# Patient Record
(demographics unavailable — no encounter records)

---

## 2024-12-12 NOTE — ASSESSMENT
[FreeTextEntry1] : NSVT several episodes of NSVT on monitor  will order coronary CT  Dyspnea in setting lung disease, recently worse ischemic evaluation planned as above   pAfib  NSR today  cont eliquis, toprol 50mg, diltiazem  discussed simplifying regimen, will consider next visit   HTN BP controlled on toprol, HCTZ, losartan, dilt    will f/u 3 months

## 2024-12-12 NOTE — HISTORY OF PRESENT ILLNESS
[FreeTextEntry1] : Ms. Mcmahan is a 74 yo female with a hx nephrolithiasis s/p nephrostomy tube, COPD on 2-3L baseline O2, Afib, PE, anxiety, hypertension, colon ca, RADHA, preDM presenting for a routine follow up.   Pt was initially admitted to  due to obstructive left ureteral stone requiring nephrostomy tube placement with course complicated by new Afib in setting PE 12/2023.     Cardiac monitor showed several episodes of NSVT  Reports recent worsening exertional dyspnea.  Denies chest pain, palpitations, dizziness, syncope    Surgical hx- total hysterectomy, colon ca resection fistula lung ca wedge resection on left, robe lower lobe, hip replacements, carpal tunnel, lithotripsy/nephrostomy tube   tobacco - prior, quit 10 years ago  alcohol- none  drug use- none  caffeine- none  family hx none  Ambulates minimally without assistance at home. Uses wheelchair when out for longer distances.

## 2024-12-12 NOTE — HISTORY OF PRESENT ILLNESS
[FreeTextEntry1] : Ms. Mcmahan is a 76 yo female with a hx nephrolithiasis s/p nephrostomy tube, COPD on 2-3L baseline O2, Afib, PE, anxiety, hypertension, colon ca, RADHA, preDM presenting for a routine follow up.   Pt was initially admitted to  due to obstructive left ureteral stone requiring nephrostomy tube placement with course complicated by new Afib in setting PE 12/2023.     Cardiac monitor showed several episodes of NSVT  Reports recent worsening exertional dyspnea.  Denies chest pain, palpitations, dizziness, syncope    Surgical hx- total hysterectomy, colon ca resection fistula lung ca wedge resection on left, robe lower lobe, hip replacements, carpal tunnel, lithotripsy/nephrostomy tube   tobacco - prior, quit 10 years ago  alcohol- none  drug use- none  caffeine- none  family hx none  Ambulates minimally without assistance at home. Uses wheelchair when out for longer distances.

## 2025-01-02 NOTE — PHYSICAL EXAM
[Normal Oropharynx] : normal oropharynx [Normal Appearance] : normal appearance [No Neck Mass] : no neck mass [Normal Rate/Rhythm] : normal rate/rhythm [Normal S1, S2] : normal s1, s2 [No Murmurs] : no murmurs [No Abnormalities] : no abnormalities [Benign] : benign [No Edema] : no edema [FROM] : FROM [No Focal Deficits] : no focal deficits [Oriented x3] : oriented x3 [Normal Affect] : normal affect [TextBox_2] : Breathless during exam [TextBox_68] : Bilateral wheezing throughout

## 2025-01-02 NOTE — HISTORY OF PRESENT ILLNESS
[TextBox_4] :  RIYA WHALEY 76-year female seen in clinic for FOLLOW UP VISIT for COPD with EMPHYSEMA.  Patient's breathing is chronically impaired with wheezing, cough and congestion with sputum.  Patient has had chronic, productive, and continued cough with large amounts of sputum production for the past 10 years and has tried and failed multiple including manual CPT therapy which was attempted but was found ineffective and failed to mobilize secretions because patient was intolerant and in pain during therapy.    Patient states that she is at baseline for all symptoms which is poor and there has been no change in the abundance of mucus production. She has tried many different therapies for the secretions with respiratory therapy, and manual CPT therapy, and some strange device that she is not even sure what that just did not help.  Patient is very frustrated with chronic breathing difficulty.  In the past 12 months the patient has been taking her inhalers Breztri 160, 2 puffs twice daily and albuterol as rescue as well as Roflumilast daily.  She is currently trying to be approved for Ohtuvayre inhalation solution for COPD.  In the past 12 months she has been treated with prednisone treatments (3 in the past 3 months). as well as Kenalog shots, doxycycline multiple times, azithromycin, cephalexin, Bactrim and tried and failed Advair Breva and Symbicort inhalers.

## 2025-01-02 NOTE — DISCUSSION/SUMMARY
[FreeTextEntry1] : 1. Acute Bronchitis, COPD w/ Emphysema, H/o lung cancer w/ lobectomy--> Kenalog injection given to patient in left deltoid with no complications.  Start doxycycline 100 mg twice daily for 10 days and prednisone 20 mg daily for 10 days.  Refills sent for Breztri, Roflumilast, albuterol, and ipratropium albuterol inhalation solution.  Prior Auth for Ohtuvayre inhalation solution is pending.  Will attempt to order compression vest for patient as well. 2. Bronchiectasis on Chest CT noted 8/11/2015 --> Will order Chest CT PRN.  3.  Patient has appointment in 6 weeks for follow-up and will keep appointment if still symptomatic otherwise we will see in 6 months for repeat PFT and symptom reevaluation.

## 2025-01-02 NOTE — REVIEW OF SYSTEMS
[Cough] : cough [Chest Tightness] : chest tightness [Sputum] : sputum [Dyspnea] : dyspnea [Wheezing] : wheezing [SOB on Exertion] : sob on exertion [Negative] : Neurologic [Fever] : no fever [Chills] : no chills [TextBox_30] : Chronic respiratory symptoms are at baseline

## 2025-02-03 NOTE — PLAN
[FreeTextEntry1] : Wash daily with hibiclens. Silvadene to wound. Finish po abc. F/U 1 week. Keep legs elevated. total time > 22 minutes

## 2025-02-03 NOTE — PHYSICAL EXAM
[JVD] : no jugular venous distention  [Normal Breath Sounds] : Normal breath sounds [Normal Heart Sounds] : normal heart sounds [Calm] : calm [de-identified] : soft [de-identified] : R post calf wound - 1 cm. L shin wound - 4 cm

## 2025-02-05 NOTE — DISCUSSION/SUMMARY
[FreeTextEntry1] : 1.  Hypoxia on continuous supplemental oxygen at 2 L/emphysema and COPD--> prescription for oxygen concentrator will be complete with walk test and fax to [facility]. 2.  Patient has appointment in 6 weeks for follow-up and will keep appointment if still symptomatic otherwise we will see in 6 months for repeat PFT and symptom reevaluation.

## 2025-02-05 NOTE — PHYSICAL EXAM
[Normal Oropharynx] : normal oropharynx [Normal Appearance] : normal appearance [No Neck Mass] : no neck mass [Normal Rate/Rhythm] : normal rate/rhythm [Normal S1, S2] : normal s1, s2 [No Murmurs] : no murmurs [No Abnormalities] : no abnormalities [Benign] : benign [Calf Tenderness (Right)] : right calf tenderness [Calf Tenderness (Left)] : left calf tenderness [No Focal Deficits] : no focal deficits [Oriented x3] : oriented x3 [Normal Mood] : normal mood [Normal Insight/judgment] : normal insight/judgment [Normal Affect] : normal affect [TextBox_2] : Breathless during exam [TextBox_68] : Bilateral wheezing throughout [TextBox_105] : Bilateral calves covered with clean dry Ace bandages applied by wound care specialist today.  Bilateral feet are edematous, cold, and dark red WDL

## 2025-02-05 NOTE — HISTORY OF PRESENT ILLNESS
[Continuous] : Continuous [NC] : Nasal Cannula [24 hrs] : 24 hours/day [TextBox_4] : RIYA WHALEY is a 76 year female seen in clinic for FOLLOW UP VISIT for COPD with Emphysema.  Patient is on 24-hour supplemental oxygen at 2 L for hypoxia.  Patient has baseline complaints of SOBOE, wheezing, dyspnea, chest congestion, and coughing.   Patient has new cellulitis of bilateral lower extremities due to injury while getting out of bed and is being seen by a wound specialist for care.  Patient is here today for walk test for oxygenation. Oxygenation results: 91% on 2 L on portable supplemental oxygen sitting 94% sitting on room air Sats decreased to 83% during rolling walker assisted ambulation on room air. Sats increased to 95% during ambulation on 2 L on portable supplemental oxygen. [FreeTextEntry1] : 2

## 2025-02-05 NOTE — PHYSICAL EXAM
[Normal Oropharynx] : normal oropharynx [Normal Appearance] : normal appearance [No Neck Mass] : no neck mass [Normal Rate/Rhythm] : normal rate/rhythm [Normal S1, S2] : normal s1, s2 [No Murmurs] : no murmurs [No Abnormalities] : no abnormalities [Benign] : benign [Calf Tenderness (Right)] : right calf tenderness [Calf Tenderness (Left)] : left calf tenderness [No Focal Deficits] : no focal deficits [Oriented x3] : oriented x3 [Normal Mood] : normal mood [Normal Insight/judgment] : normal insight/judgment [Normal Affect] : normal affect [TextBox_2] : Breathless during exam [TextBox_68] : Bilateral wheezing throughout [TextBox_105] : Bilateral calves covered with clean dry Ace bandages applied by wound care specialist today.  Bilateral feet are edematous, cold, and dark red

## 2025-02-05 NOTE — REASON FOR VISIT
[Follow-Up] : a follow-up visit [Family Member] : family member [Other: _____] : [unfilled] [TextBox_44] : Follow up evaluation and walking test

## 2025-02-14 NOTE — PHYSICAL EXAM
[Normal Breath Sounds] : Normal breath sounds [Normal Heart Sounds] : normal heart sounds [Calm] : calm [de-identified] : on O2 [de-identified] : soft [de-identified] : R posterior calf wound 1 x .5 cm. L shin wound - less deep

## 2025-02-25 NOTE — PHYSICAL EXAM
[Normal Oropharynx] : normal oropharynx [Normal Appearance] : normal appearance [No Neck Mass] : no neck mass [Normal Rate/Rhythm] : normal rate/rhythm [Normal S1, S2] : normal s1, s2 [No Murmurs] : no murmurs [No Resp Distress] : no resp distress [Wheeze] : wheeze [Benign] : benign [No Edema] : no edema [Oriented x3] : oriented x3 [Normal Mood] : normal mood [Normal Affect] : normal affect [TextBox_2] : Using portable oxygen via nasal cannula.  Ambulates with rolling walker.  Breathing heavily with a [TextBox_68] : Bilateral wheezing noted in all fields [TextBox_99] : Unsteady gait, severe scoliosis noted [TextBox_105] : Bilateral lower extremities covered with clean dry Ace bandages

## 2025-02-25 NOTE — HISTORY OF PRESENT ILLNESS
[Former] : former [Never] : never [TextBox_4] : RIYA WHALEY is a 76 year female seen in clinic for FOLLOW UP VISIT for COPD/Emphysema with new C/O severe chest congestion, wheezing, dyspnea, and SOB.  Patient denies fever or chills.  Patient states symptoms are very similar to last month's bronchitis and she fully recovered and felt much better after her Kenalog shot and additional treatments provided.  Patient currently on Keflex daily for lower extremity wound management infection.  States after starting Keflex her symptoms improved.  Patient currently taking Breztri, and ultra via nebulizer with good relief of symptoms.  Patient is currently using smart vest therapy to remove mucus and sputum but feels like the vest is not bringing up much sputum.   Patient accompanied by daughter Jackie.

## 2025-02-25 NOTE — DISCUSSION/SUMMARY
[FreeTextEntry1] : 1.  Acute exacerbation of COPD and emphysema--> Kenalog IM shot given to patient in left deltoid with no complications in office.  Patient to start prednisone 20 mg for 20 days.  Patient has Z-Steve at home and will use after Keflex antibiotics are finished from wound management.  She has been instructed to start the new antibiotic only if her symptoms are not improving.  Patient will go to urgent care or ED if symptoms worsen.  Patient to continue Breztri inhaler and Ohtuvayre nebulizer as maintenance and albuterol as rescue.  Patient encouraged to take Mucinex DM to break up congestion prior to smart vest use to facilitate sputum/mucus expectoration.  Will continue to work on prior authorization for ipratropium albuterol nebulizer solution. 2.  Severe lung restriction due to scoliosis-> patient suffers from severe lung restriction due to scoliosis requiring use of V+ Pro noninvasive ventilation to improve lung function and prevent hospital admissions.  All CPAP and BiPAP therapies including spontaneous mode, ST mode, and AVAPs were considered but ruled out as they do not allow for assured ventilation or dual modes for daytime use with a targeted tidal volume as well as high flow therapies only available with V+ Pro.  Patient also requires high flow nasal cannula during the day and NIV at night.  NIV offer safety and portability of backup battery allowing for uninterrupted therapy.  Referral made for V+ Pro NIV will be made and given to [facility]. 2.  Patient will follow-up in 2 months for repeat PFT and symptom reevaluation.

## 2025-02-25 NOTE — REASON FOR VISIT
[Family Member] : family member [Other: _____] : [unfilled] [Follow-Up] : a follow-up visit [TextBox_44] : Pulmonary evaluation-  Very congested

## 2025-02-25 NOTE — REVIEW OF SYSTEMS
[Cough] : cough [Chest Tightness] : chest tightness [Sputum] : sputum [Dyspnea] : dyspnea [Wheezing] : wheezing [SOB on Exertion] : sob on exertion [Obesity] : obesity [Negative] : Neurologic [Chronic Pain] : chronic pain [Fever] : no fever [Chills] : no chills [Diabetes] : no diabetes [TextBox_30] : Chronic respiratory symptoms are at baseline [TextBox_94] : Scoliosis [TextBox_122] : Unsteady gait and ambulates with rolling walker